# Patient Record
Sex: MALE | Race: WHITE | Employment: FULL TIME | URBAN - METROPOLITAN AREA
[De-identification: names, ages, dates, MRNs, and addresses within clinical notes are randomized per-mention and may not be internally consistent; named-entity substitution may affect disease eponyms.]

---

## 2019-09-08 ENCOUNTER — HOSPITAL ENCOUNTER (EMERGENCY)
Age: 24
Discharge: HOME | End: 2019-09-08

## 2019-09-08 VITALS
DIASTOLIC BLOOD PRESSURE: 80 MMHG | TEMPERATURE: 98.1 F | OXYGEN SATURATION: 99 % | SYSTOLIC BLOOD PRESSURE: 128 MMHG | HEART RATE: 89 BPM | HEIGHT: 72 IN | BODY MASS INDEX: 19.64 KG/M2 | WEIGHT: 145 LBS | RESPIRATION RATE: 16 BRPM

## 2019-09-08 DIAGNOSIS — R22.0 SWELLING OF LEFT SIDE OF FACE: Primary | ICD-10-CM

## 2019-09-08 DIAGNOSIS — K02.9 DENTAL CARIES: ICD-10-CM

## 2019-09-08 DIAGNOSIS — K05.10 GINGIVITIS: ICD-10-CM

## 2019-09-08 PROCEDURE — 99282 EMERGENCY DEPT VISIT SF MDM: CPT

## 2019-09-08 PROCEDURE — 6370000000 HC RX 637 (ALT 250 FOR IP): Performed by: PHYSICIAN ASSISTANT

## 2019-09-08 RX ORDER — PENICILLIN V POTASSIUM 250 MG/1
500 TABLET ORAL ONCE
Status: COMPLETED | OUTPATIENT
Start: 2019-09-08 | End: 2019-09-08

## 2019-09-08 RX ORDER — PENICILLIN V POTASSIUM 500 MG/1
500 TABLET ORAL 4 TIMES DAILY
Qty: 40 TABLET | Refills: 0 | Status: SHIPPED | OUTPATIENT
Start: 2019-09-08 | End: 2019-09-18

## 2019-09-08 RX ORDER — CHLORHEXIDINE GLUCONATE 0.12 MG/ML
15 RINSE ORAL 2 TIMES DAILY
Qty: 420 ML | Refills: 0 | Status: SHIPPED | OUTPATIENT
Start: 2019-09-08 | End: 2019-09-22

## 2019-09-08 RX ORDER — IBUPROFEN 800 MG/1
800 TABLET ORAL EVERY 8 HOURS PRN
Qty: 20 TABLET | Refills: 0 | Status: SHIPPED | OUTPATIENT
Start: 2019-09-08 | End: 2019-09-08 | Stop reason: SDUPTHER

## 2019-09-08 RX ORDER — IBUPROFEN 800 MG/1
800 TABLET ORAL EVERY 8 HOURS PRN
Qty: 20 TABLET | Refills: 0 | OUTPATIENT
Start: 2019-09-08 | End: 2022-02-23

## 2019-09-08 RX ADMIN — PENICILLIN V POTASIUM 500 MG: 250 TABLET ORAL at 12:33

## 2019-09-08 ASSESSMENT — ENCOUNTER SYMPTOMS
ABDOMINAL PAIN: 0
COUGH: 0
TROUBLE SWALLOWING: 0
NAUSEA: 0
SHORTNESS OF BREATH: 0
CONSTIPATION: 0
SINUS PRESSURE: 0
VOMITING: 0
DIARRHEA: 0
FACIAL SWELLING: 1
SORE THROAT: 0

## 2019-09-08 ASSESSMENT — PAIN DESCRIPTION - PAIN TYPE: TYPE: ACUTE PAIN

## 2019-09-08 ASSESSMENT — PAIN SCALES - GENERAL: PAINLEVEL_OUTOF10: 7

## 2019-09-08 ASSESSMENT — PAIN DESCRIPTION - LOCATION: LOCATION: TEETH

## 2019-09-08 NOTE — ED PROVIDER NOTES
shortness of breath. Cardiovascular: Negative for chest pain. Gastrointestinal: Negative for abdominal pain, constipation, diarrhea, nausea and vomiting. All other systems reviewed and are negative. Positives and Pertinent negatives as per HPI. Except as noted above in the ROS, all other systems were reviewed/completed and are negative. Physical Exam:  Physical Exam   Constitutional: He is oriented to person, place, and time. He appears well-developed and well-nourished. He is active and cooperative. Non-toxic appearance. HENT:   Head: Normocephalic. Right Ear: External ear normal.   Left Ear: External ear normal.   Nose: Nose normal.   Mouth/Throat: Uvula is midline and oropharynx is clear and moist. No oral lesions. No trismus in the jaw. Abnormal dentition. Dental caries present. No dental abscesses (no visable or palpable abscess) or uvula swelling. No oropharyngeal exudate, posterior oropharyngeal edema, posterior oropharyngeal erythema or tonsillar abscesses. Patient does have partial missing tooth #10 in #12. No uvula swelling, no trismus. Unable to palpate or visualize an abscess. Eyes: Conjunctivae are normal. Right eye exhibits no discharge. Left eye exhibits no discharge. Neck: Normal range of motion. Neck supple. Cardiovascular: Normal rate, regular rhythm and normal heart sounds. Exam reveals no gallop and no friction rub. No murmur heard. Pulmonary/Chest: Effort normal and breath sounds normal. No stridor. No respiratory distress. He has no wheezes. He has no rales. Musculoskeletal: Normal range of motion. Neurological: He is alert and oriented to person, place, and time. Skin: Skin is warm and dry. He is not diaphoretic. No pallor. Psychiatric: He has a normal mood and affect. His behavior is normal.   Nursing note and vitals reviewed.       MEDICAL DECISION MAKING    Vitals:    Vitals:    09/08/19 1155   BP: 128/80   Pulse: 89   Resp: 16   Temp: 98.1 °F (36.7 antibiotics for home. He is given a dental clinic list if he is unable to get into his dentist.  He is to return if he has been worsening of symptoms, development of fevers, difficulty swallowing. Patient is agreeable with this plan of care and otherwise well-appearing at time of discharge home. No signs of Eric's angina, trench mouth or other emergent dental etiology. The patient tolerated their visit well. I have evaluated the patient with physician available for consultation as needed. I have discussed the findings of today's workup with the patient and addressed the patient's questions and concerns. Important warning signs as well as new or worsening symptoms which wouldnecessitate immediate return to the ED were discussed. The plan is to discharge from the ED at this time, and the patient is in stable condition. The patient acknowledged understanding is agreeable with this plan. CLINICAL IMPRESSION:  1. Swelling of left side of face    2. Dental caries    3.  Gingivitis        DISPOSITION Decision To Discharge 09/08/2019 12:15:41 PM      PATIENT REFERRED TO:  see dental clinic list    Schedule an appointment as soon as possible for a visit       [facility] Emergency Department  [address] [place] [address]  [phone]  Go to   If symptoms worsen      DISCHARGE MEDICATIONS:  New Prescriptions    CHLORHEXIDINE (PERIDEX) 0.12 % SOLUTION    Take 15 mLs by mouth 2 times daily for 14 days    IBUPROFEN (ADVIL;MOTRIN) 800 MG TABLET    Take 1 tablet by mouth every 8 hours as needed for Pain    PENICILLIN V POTASSIUM (VEETID) 500 MG TABLET    Take 1 tablet by mouth 4 times daily for 10 days              (Please note the MDM and HPI sections of this note were completed with avoice recognition program.  Efforts were made to edit the dictations but occasionally words are mis-transcribed.)    Electronically signed, JAYLON Moran,            JAYLON Alvarado  09/08/19 2907

## 2019-11-06 ENCOUNTER — HOSPITAL ENCOUNTER (EMERGENCY)
Age: 24
Discharge: HOME | End: 2019-11-06

## 2019-11-06 VITALS
RESPIRATION RATE: 16 BRPM | OXYGEN SATURATION: 98 % | BODY MASS INDEX: 20.99 KG/M2 | WEIGHT: 155 LBS | DIASTOLIC BLOOD PRESSURE: 74 MMHG | HEIGHT: 72 IN | TEMPERATURE: 98.3 F | HEART RATE: 86 BPM | SYSTOLIC BLOOD PRESSURE: 129 MMHG

## 2019-11-06 DIAGNOSIS — J06.9 VIRAL URI WITH COUGH: Primary | ICD-10-CM

## 2019-11-06 DIAGNOSIS — H10.9 CONJUNCTIVITIS OF BOTH EYES, UNSPECIFIED CONJUNCTIVITIS TYPE: ICD-10-CM

## 2019-11-06 PROCEDURE — 99283 EMERGENCY DEPT VISIT LOW MDM: CPT

## 2019-11-06 RX ORDER — POLYMYXIN B SULFATE AND TRIMETHOPRIM 1; 10000 MG/ML; [USP'U]/ML
1 SOLUTION OPHTHALMIC EVERY 4 HOURS
Qty: 10 ML | Refills: 0 | Status: SHIPPED | OUTPATIENT
Start: 2019-11-06 | End: 2019-11-16

## 2019-11-06 RX ORDER — LORATADINE 10 MG/1
10 TABLET ORAL DAILY
Qty: 30 TABLET | Refills: 0 | Status: SHIPPED | OUTPATIENT
Start: 2019-11-06

## 2019-11-06 ASSESSMENT — ENCOUNTER SYMPTOMS
EYE REDNESS: 1
EYE ITCHING: 1
PHOTOPHOBIA: 0
SHORTNESS OF BREATH: 0
SORE THROAT: 1
VOMITING: 0
EYE PAIN: 0
RHINORRHEA: 1
NAUSEA: 0
COUGH: 1
DIARRHEA: 0
WHEEZING: 0
TROUBLE SWALLOWING: 0
ABDOMINAL PAIN: 0
EYE DISCHARGE: 1
VOICE CHANGE: 0

## 2021-07-22 ENCOUNTER — HOSPITAL ENCOUNTER (EMERGENCY)
Age: 26
Discharge: HOME | End: 2021-07-22
Attending: EMERGENCY MEDICINE
Payer: COMMERCIAL

## 2021-07-22 VITALS
SYSTOLIC BLOOD PRESSURE: 143 MMHG | DIASTOLIC BLOOD PRESSURE: 89 MMHG | OXYGEN SATURATION: 99 % | RESPIRATION RATE: 18 BRPM | HEART RATE: 61 BPM | TEMPERATURE: 97.8 F

## 2021-07-22 DIAGNOSIS — S61.012A LACERATION OF LEFT THUMB, FOREIGN BODY PRESENCE UNSPECIFIED, NAIL DAMAGE STATUS UNSPECIFIED, INITIAL ENCOUNTER: Primary | ICD-10-CM

## 2021-07-22 PROCEDURE — 90471 IMMUNIZATION ADMIN: CPT | Performed by: STUDENT IN AN ORGANIZED HEALTH CARE EDUCATION/TRAINING PROGRAM

## 2021-07-22 PROCEDURE — 99283 EMERGENCY DEPT VISIT LOW MDM: CPT

## 2021-07-22 PROCEDURE — 2500000003 HC RX 250 WO HCPCS: Performed by: STUDENT IN AN ORGANIZED HEALTH CARE EDUCATION/TRAINING PROGRAM

## 2021-07-22 PROCEDURE — 12001 RPR S/N/AX/GEN/TRNK 2.5CM/<: CPT

## 2021-07-22 PROCEDURE — 90715 TDAP VACCINE 7 YRS/> IM: CPT | Performed by: STUDENT IN AN ORGANIZED HEALTH CARE EDUCATION/TRAINING PROGRAM

## 2021-07-22 PROCEDURE — 6360000002 HC RX W HCPCS: Performed by: STUDENT IN AN ORGANIZED HEALTH CARE EDUCATION/TRAINING PROGRAM

## 2021-07-22 RX ORDER — BUPIVACAINE HYDROCHLORIDE 2.5 MG/ML
30 INJECTION, SOLUTION EPIDURAL; INFILTRATION; INTRACAUDAL ONCE
Status: COMPLETED | OUTPATIENT
Start: 2021-07-22 | End: 2021-07-22

## 2021-07-22 RX ADMIN — TETANUS TOXOID, REDUCED DIPHTHERIA TOXOID AND ACELLULAR PERTUSSIS VACCINE, ADSORBED 0.5 ML: 5; 2.5; 8; 8; 2.5 SUSPENSION INTRAMUSCULAR at 15:15

## 2021-07-22 RX ADMIN — BUPIVACAINE HYDROCHLORIDE 75 MG: 2.5 INJECTION, SOLUTION EPIDURAL; INFILTRATION; INTRACAUDAL; PERINEURAL at 15:22

## 2021-07-22 ASSESSMENT — PAIN SCALES - GENERAL: PAINLEVEL_OUTOF10: 0

## 2021-07-22 ASSESSMENT — ENCOUNTER SYMPTOMS
NAUSEA: 0
VOMITING: 0
SHORTNESS OF BREATH: 0
SORE THROAT: 0
DIARRHEA: 0
COUGH: 0

## 2021-07-22 NOTE — ED PROVIDER NOTES
Date of evaluation: 7/22/2021    Chief Complaint   Laceration (cut his left thumb with a knife while cutting singh)      Nursing Notes, Past Medical Hx, Past Surgical Hx, Social Hx, Allergies, and Family Hx were reviewed. History of Present Illness     Yovani Pappas is a 22 y.o. male who presents to the ED after he was cutting singh and used a knife to cut towards him and he lacerated his left thumb. This occurred around a half hour ago and it has not stopped bleeding, which is why he came to the ED. He tried to stop it by wrapping it up, but it continues to bleed on the radial side of his left thumb. He also reports some numbness and tingling. He describes the pain as a sharp pain that is localized to his thumb. He states the pain is an 8 out of 10. Review of Systems     Review of Systems   Constitutional: Negative for fatigue and fever. HENT: Negative for congestion and sore throat. Respiratory: Negative for cough and shortness of breath. Cardiovascular: Negative for chest pain and palpitations. Gastrointestinal: Negative for diarrhea, nausea and vomiting. Genitourinary: Negative for difficulty urinating. Musculoskeletal: Negative for arthralgias and myalgias. Skin: Positive for wound. Negative for rash. Neurological: Positive for numbness. Negative for dizziness and headaches. Hematological: Does not bruise/bleed easily. Psychiatric/Behavioral: Negative for confusion and hallucinations. All other systems reviewed and are negative. Past Medical, Surgical, Family, and Social History         Diagnosis Date    Kidney stone          Procedure Laterality Date    CYSTOSCOPY  4/29/13    with right retrograde and right stent placement    CYSTOSCOPY  5/10/2013    Removal of R Jtube and R ureteroscopy     His family history is not on file. He reports that he has been smoking. He has a 1.00 pack-year smoking history.  He has never used smokeless tobacco. He reports current alcohol use. He reports current drug use. Drug: Marijuana. Medications     Previous Medications    IBUPROFEN (ADVIL;MOTRIN) 800 MG TABLET    Take 1 tablet by mouth every 8 hours as needed for Pain    LORATADINE (CLARITIN) 10 MG TABLET    Take 1 tablet by mouth daily       Allergies     He has No Known Allergies. Physical Exam     INITIAL VITALS: BP (!) 143/89   Pulse 61   Temp 97.8 °F (36.6 °C) (Oral)   Resp 18   SpO2 99%        Physical Exam  Constitutional:       Appearance: Normal appearance. HENT:      Head: Normocephalic and atraumatic. Nose: Nose normal. No congestion or rhinorrhea. Mouth/Throat:      Mouth: Mucous membranes are moist.      Pharynx: Oropharynx is clear. Cardiovascular:      Rate and Rhythm: Normal rate and regular rhythm. Pulses: Normal pulses. Heart sounds: Normal heart sounds. Pulmonary:      Effort: Pulmonary effort is normal.      Breath sounds: Normal breath sounds. Abdominal:      General: Abdomen is flat. Bowel sounds are normal.      Palpations: Abdomen is soft. Musculoskeletal:         General: Normal range of motion. Skin:     General: Skin is warm and dry. Findings: Lesion present. Comments: Laceration present on radial side of left thumb    Neurological:      General: No focal deficit present. Mental Status: He is alert and oriented to person, place, and time. Psychiatric:         Mood and Affect: Mood normal.         Behavior: Behavior normal.       Extremities: sensation intact B/L in UE and LE       Diagnostic Results         RADIOLOGY:  No orders to display          LABS:   Labs Reviewed - No data to display    RECENT VITALS:  BP: (!) 143/89, Temp: 97.8 °F (36.6 °C), Pulse: 61, Resp: 18     Procedures     Per attending Physician, Dr. Luther Mccord note.      ED Course     The patient was given the following medications:  Orders Placed This Encounter   Medications    Tetanus-Diphth-Acell Pertussis (BOOSTRIX) injection 0.5 mL    bupivacaine (PF) (MARCAINE) 0.25 % injection 75 mg            CONSULTS:  None    MEDICAL DECISION MAKING     Allison Mullins is a 22 y.o. male who presents to the ED after a laceration on the radial side of his left thumb. The patient's wound was debrided and irrigated to prevent infection. Digital block with bupivacaine applied. Patient was given 4 sutures of his left thumb to close up wound with sterile precautions. He was given a Tetanus injection. Due to the nature of his wound, it was decided it was classified as a \"dirty wound,\" so his tetanus will last 5 years. Patient was given information for wound care and instructed to use hydrogen peroxide on wound. He was additionally instructed to Follow-up with PCP or ED for removal of his sutures in around 8 days. This patient was also evaluated by the attending physician. All care plans were discussed and agreed upon. Clinical Impression     1. Laceration of left thumb, foreign body presence unspecified, nail damage status unspecified, initial encounter        Disposition/Plan     PATIENT REFERRED TO:  No follow-up provider specified.     DISCHARGE MEDICATIONS:  New Prescriptions    No medications on file       Amada Frazier07-A 1498, DO  Resident  07/22/21 2341

## 2021-07-22 NOTE — ED PROVIDER NOTES
ED Attending Attestation Note     Date of evaluation: 7/22/2021    This patient was seen by the resident. I have seen and examined the patient, agree with the workup, evaluation, management and diagnosis. The care plan has been discussed. My assessment reveals here for a left thumb laceration. He was placing some singh and excellently cut himself with a singh knife. He denies numbness tingling decreased function of his thumb. On exam 2.5 cm laceration that starts on the palmar aspect and travels on the ulnar part of the thumb to the PIP joint. Does not appear to violate the joint. Lac Repair    Date/Time: 7/22/2021 3:53 PM  Performed by: Nish Marquez MD  Authorized by: Nish Marquez MD     Consent:     Consent obtained:  Verbal    Consent given by:  Patient    Risks discussed:  Infection, pain, retained foreign body and poor cosmetic result    Alternatives discussed:  No treatment  Anesthesia (see MAR for exact dosages):      Anesthesia method:  Nerve block    Block anesthetic:  Bupivacaine 0.25% w/o epi    Block injection procedure:  Anatomic landmarks identified and negative aspiration for blood    Block outcome:  Anesthesia achieved  Laceration details:     Location:  Finger    Finger location:  L thumb    Length (cm):  2.5    Depth (mm):  5  Repair type:     Repair type:  Simple  Pre-procedure details:     Preparation:  Patient was prepped and draped in usual sterile fashion  Exploration:     Hemostasis achieved with:  Direct pressure    Wound extent: areolar tissue violated      Wound extent: no muscle damage noted, no nerve damage noted and no tendon damage noted      Contaminated: no    Treatment:     Area cleansed with:  Hibiclens    Amount of cleaning:  Standard    Irrigation solution:  Sterile saline    Irrigation method:  Syringe    Visualized foreign bodies/material removed: no    Skin repair:     Repair method:  Sutures    Suture size:  5-0    Suture material:  Nylon Number of sutures:  4  Approximation:     Approximation:  Close  Post-procedure details:     Dressing:  Antibiotic ointment and non-adherent dressing    Patient tolerance of procedure:   Tolerated well, no immediate complications         Mandeep Gilman MD  07/22/21 1457

## 2021-07-22 NOTE — DISCHARGE INSTR - COC
Continuity of Care Form    Patient Name: Zena Kemp   :  1995  MRN:  [id number]    Admit date:  2021  Discharge date:  ***    Code Status Order: Prior   Advance Directives:     Admitting Physician:  No admitting provider for patient encounter. PCP: Kayla Langston MD    Discharging Nurse: [facility] Unit/Room#: B18/B18-18  Discharging Unit Phone Number: ***    Emergency Contact:   Extended Emergency Contact Information  Primary Emergency Contact: Anastasia Jackson  Address: [address] [place] [address], [address] [place] [address] Phone: [phone]  Mobile Phone: [phone]  Relation: Parent    Past Surgical History:  Past Surgical History:   Procedure Laterality Date    CYSTOSCOPY  13    with right retrograde and right stent placement    CYSTOSCOPY  5/10/2013    Removal of R Jtube and R ureteroscopy       Immunization History:   Immunization History   Administered Date(s) Administered    Tdap (Boostrix, Adacel) 2021       Active Problems: There is no problem list on file for this patient.       Isolation/Infection:   Isolation          No Isolation        Patient Infection Status     None to display          Nurse Assessment:  Last Vital Signs: BP (!) 143/89   Pulse 61   Temp 97.8 °F (36.6 °C) (Oral)   Resp 18   SpO2 99%     Last documented pain score (0-10 scale): Pain Level: 0  Last Weight:   Wt Readings from Last 1 Encounters:   19 155 lb (70.3 kg)     Mental Status:  {IP PT MENTAL STATUS:10340}    IV Access:  { MAYRA IV ACCESS:757142605}    Nursing Mobility/ADLs:  Walking   {CHP DME FKDB:561854792}  Transfer  {CHP DME QYQQ:639073993}  Bathing  {CHP DME LVPC:679018244}  Dressing  {CHP DME OZTK:842726106}  Toileting  {CHP DME DYQV:439668438}  Feeding  {CHP DME UIIU:045684723}  Med Admin  {CHP DME VQLF:894904284}  Med Delivery   { MAYRA MED Delivery:654223134}    Wound Care Documentation and Therapy:        Elimination:  Continence: · Bowel: {YES / JE:87400}  · Bladder: {YES / BJ:66521}  Urinary Catheter: {Urinary Catheter:263211867}   Colostomy/Ileostomy/Ileal Conduit: {YES / GA:66554}       Date of Last BM: ***  No intake or output data in the 24 hours ending 21 1558  No intake/output data recorded.     Safety Concerns:     [address] [facility] Safety Concerns:618349856}    Impairments/Disabilities:      [address] [facility] Impairments/Disabilities:862637055}    Nutrition Therapy:  Current Nutrition Therapy:   [address] [facility] Diet List:798475185}    Routes of Feeding: {CHP DME Other Feedings:979115463}  Liquids: {Slp liquid thickness:56788}  Daily Fluid Restriction: {CHP DME Yes amt example:002073142}  Last Modified Barium Swallow with Video (Video Swallowing Test): {Done Not Done TPXZ:605268976}    Treatments at the Time of Hospital Discharge:   Respiratory Treatments: ***  Oxygen Therapy:  {Therapy; copd oxygen:09289}  Ventilator:    {[facility] Vent HTKO:583768570}    Rehab Therapies: {THERAPEUTIC INTERVENTION:7522638063}  Weight Bearing Status/Restrictions: [address] [place] [address] Weight Bearin}  Other Medical Equipment (for information only, NOT a DME order):  {EQUIPMENT:929097206}  Other Treatments: ***    Patient's personal belongings (please select all that are sent with patient):  {P DME Belongings:864031427}    RN SIGNATURE:  {Esignature:316842508}    CASE MANAGEMENT/SOCIAL WORK SECTION    Inpatient Status Date: ***    Readmission Risk Assessment Score:  Readmission Risk              Risk of Unplanned Readmission:  0           Discharging to Facility/ Agency   · Name:   · Address:  · Phone:  · Fax:    Dialysis Facility (if applicable)   · Name:  · Address:  · Dialysis Schedule:  · Phone:  · Fax:    / signature: {Esignature:636848604}    PHYSICIAN SECTION    Prognosis: {Prognosis:1970411901}    Condition at Discharge: [address] Patient Condition:868167200}    Rehab Potential (if transferring to Rehab): {Prognosis:5721485155}    Recommended Labs or Other Treatments After Discharge: ***    Physician Certification: I certify the above information and transfer of [place] [facility]  is necessary for the continuing treatment of the diagnosis listed and that he requires {Admit to Appropriate Level of Care:10032} for {GREATER/LESS:772532504} 30 days.      Update Admission H&P: {CHP DME Changes in YLJRP:565689795}    PHYSICIAN SIGNATURE:  {Esignature:871336902}

## 2022-02-23 ENCOUNTER — HOSPITAL ENCOUNTER (EMERGENCY)
Age: 27
Discharge: HOME | End: 2022-02-23
Payer: COMMERCIAL

## 2022-02-23 VITALS
TEMPERATURE: 98.4 F | BODY MASS INDEX: 21.67 KG/M2 | HEART RATE: 92 BPM | RESPIRATION RATE: 20 BRPM | WEIGHT: 160 LBS | SYSTOLIC BLOOD PRESSURE: 142 MMHG | DIASTOLIC BLOOD PRESSURE: 94 MMHG | OXYGEN SATURATION: 100 % | HEIGHT: 72 IN

## 2022-02-23 DIAGNOSIS — S16.1XXA CERVICAL STRAIN, ACUTE, INITIAL ENCOUNTER: Primary | ICD-10-CM

## 2022-02-23 PROCEDURE — 6370000000 HC RX 637 (ALT 250 FOR IP): Performed by: PHYSICIAN ASSISTANT

## 2022-02-23 PROCEDURE — 99284 EMERGENCY DEPT VISIT MOD MDM: CPT

## 2022-02-23 RX ORDER — HYDROCODONE BITARTRATE AND ACETAMINOPHEN 5; 325 MG/1; MG/1
1 TABLET ORAL ONCE
Status: COMPLETED | OUTPATIENT
Start: 2022-02-23 | End: 2022-02-23

## 2022-02-23 RX ORDER — CYCLOBENZAPRINE HCL 10 MG
10 TABLET ORAL ONCE
Status: COMPLETED | OUTPATIENT
Start: 2022-02-23 | End: 2022-02-23

## 2022-02-23 RX ORDER — NAPROXEN 500 MG/1
500 TABLET ORAL 2 TIMES DAILY WITH MEALS
Qty: 30 TABLET | Refills: 0 | Status: SHIPPED | OUTPATIENT
Start: 2022-02-23

## 2022-02-23 RX ORDER — LIDOCAINE 4 G/G
1 PATCH TOPICAL ONCE
Status: DISCONTINUED | OUTPATIENT
Start: 2022-02-23 | End: 2022-02-23 | Stop reason: HOSPADM

## 2022-02-23 RX ORDER — NAPROXEN 250 MG/1
500 TABLET ORAL ONCE
Status: COMPLETED | OUTPATIENT
Start: 2022-02-23 | End: 2022-02-23

## 2022-02-23 RX ORDER — LIDOCAINE 50 MG/G
1 PATCH TOPICAL DAILY
Qty: 30 PATCH | Refills: 0 | Status: SHIPPED | OUTPATIENT
Start: 2022-02-23

## 2022-02-23 RX ORDER — CYCLOBENZAPRINE HCL 10 MG
10 TABLET ORAL 3 TIMES DAILY PRN
Qty: 20 TABLET | Refills: 0 | Status: SHIPPED | OUTPATIENT
Start: 2022-02-23 | End: 2022-03-02

## 2022-02-23 RX ADMIN — NAPROXEN 500 MG: 250 TABLET ORAL at 15:54

## 2022-02-23 RX ADMIN — HYDROCODONE BITARTRATE AND ACETAMINOPHEN 1 TABLET: 5; 325 TABLET ORAL at 15:54

## 2022-02-23 RX ADMIN — CYCLOBENZAPRINE 10 MG: 10 TABLET, FILM COATED ORAL at 15:54

## 2022-02-23 ASSESSMENT — ENCOUNTER SYMPTOMS
DIARRHEA: 0
COLOR CHANGE: 0
PHOTOPHOBIA: 0
ABDOMINAL PAIN: 0
RESPIRATORY NEGATIVE: 1
CHEST TIGHTNESS: 0
BACK PAIN: 0
COUGH: 0
VOMITING: 0
SHORTNESS OF BREATH: 0
CONSTIPATION: 0
NAUSEA: 0

## 2022-02-23 ASSESSMENT — PAIN SCALES - GENERAL: PAINLEVEL_OUTOF10: 10

## 2022-02-23 NOTE — ED PROVIDER NOTES
[facility]        Pt Name: Broderick Snider  MRN: [id number]  [place] 1995  Date of evaluation: 2/23/2022  Provider: JAYLON Brown  PCP: Jorge Griffin MD  Note Started: 3:51 PM EST       MAXIMO. I have evaluated this patient. My supervising physician was available for consultation. CHIEF COMPLAINT       Chief Complaint   Patient presents with    Neck Pain     Came in for c/o right sided neck pain. Pain started 12am today. HISTORY OF PRESENT ILLNESS   (Location, Timing/Onset, Context/Setting, Quality, Duration, Modifying Factors, Severity, Associated Signs and Symptoms)  Note limiting factors. Chief Complaint: Neck pain    Broderick Snider is a 32 y.o. male with no significant past medical history who presents to the ED with complaint of right-sided neck pain. Patient states yesterday he woke up and states he stretched. States he felt a pull in his right-sided neck. Patient states he thought he just pulled something in his neck. States his father gave him some ibuprofen and he states pain did get better and he was able to get to sleep. States he woke up today and states he went to reach for something and felt another pull in his right-sided neck and said ever since then he has had pain that he rates as a aching/throbbing rated 9/10. States pain is any worse with palpation and movement to the right-sided neck. He denies any anterior neck pain. Denies headache, lightheadedness/dizziness, syncope, near syncope, visual changes, speech disturbances or numbness/tingling. He denies any radiation to his arms bilaterally. He denies any weakness of the arms or legs. Denies any decreased range of motion or strength. Denies chest pain, shortness of breath, You pain, nausea/vomiting, urinary symptoms or changes in bowel movements. Denies fever chills. Denies any rashes or lesions.   Denies any sore throat, dysphagia, drooling, trismus, stridor or respiratory distress. Denies taking any over-the-counter medication today for symptom control. Nursing Notes were all reviewed and agreed with or any disagreements were addressed in the HPI. REVIEW OF SYSTEMS    (2-9 systems for level 4, 10 or more for level 5)     Review of Systems   Constitutional: Negative for activity change, appetite change, chills, diaphoresis, fatigue and fever. Eyes: Negative for photophobia and visual disturbance. Respiratory: Negative. Negative for cough, chest tightness and shortness of breath. Cardiovascular: Negative. Negative for chest pain, palpitations and leg swelling. Gastrointestinal: Negative for abdominal pain, constipation, diarrhea, nausea and vomiting. Genitourinary: Negative for decreased urine volume, difficulty urinating, dysuria, flank pain, frequency, hematuria and urgency. Musculoskeletal: Positive for arthralgias, myalgias, neck pain and neck stiffness. Negative for back pain, gait problem and joint swelling. Skin: Negative for color change, pallor, rash and wound. Neurological: Negative for dizziness, tremors, seizures, syncope, facial asymmetry, speech difficulty, weakness, light-headedness, numbness and headaches. Positives and Pertinent negatives as per HPI. Except as noted above in the ROS, all other systems were reviewed and negative. PAST MEDICAL HISTORY     Past Medical History:   Diagnosis Date    Kidney stone          SURGICAL HISTORY     Past Surgical History:   Procedure Laterality Date    CYSTOSCOPY  4/29/13    with right retrograde and right stent placement    CYSTOSCOPY  5/10/2013    Removal of R Jtube and R ureteroscopy         CURRENTMEDICATIONS       Previous Medications    LORATADINE (CLARITIN) 10 MG TABLET    Take 1 tablet by mouth daily         ALLERGIES     Patient has no known allergies. FAMILYHISTORY     History reviewed. No pertinent family history. SOCIAL HISTORY       Social History     Tobacco Use    Smoking status: Current Every Day Smoker     Packs/day: 1.00     Years: 1.00     Pack years: 1.00    Smokeless tobacco: Never Used   Substance Use Topics    Alcohol use: Yes     Comment: occ    Drug use: Yes     Types: Marijuana (Weed)     Comment: occ       SCREENINGS    Dexter Coma Scale  Eye Opening: Spontaneous  Best Verbal Response: Oriented  Best Motor Response: Obeys commands  Dexter Coma Scale Score: 15        PHYSICAL EXAM    (up to 7 for level 4, 8 or more for level 5)     ED Triage Vitals [02/23/22 1528]   BP Temp Temp Source Pulse Resp SpO2 Height Weight   (!) 131/102 98.4 °F (36.9 °C) Oral 92 20 100 % 6' (1.829 m) 160 lb (72.6 kg)       Physical Exam  Constitutional:       General: He is not in acute distress. Appearance: Normal appearance. He is well-developed. He is not ill-appearing, toxic-appearing or diaphoretic. HENT:      Head: Normocephalic and atraumatic. Right Ear: External ear normal.      Left Ear: External ear normal.      Mouth/Throat:      Mouth: Mucous membranes are moist.      Pharynx: No oropharyngeal exudate or posterior oropharyngeal erythema. Eyes:      General:         Right eye: No discharge. Left eye: No discharge. Extraocular Movements: Extraocular movements intact. Conjunctiva/sclera: Conjunctivae normal.      Pupils: Pupils are equal, round, and reactive to light. Neck:      Vascular: No carotid bruit. Cardiovascular:      Rate and Rhythm: Normal rate and regular rhythm. Pulses: Normal pulses. Heart sounds: Normal heart sounds. No murmur heard. No friction rub. No gallop. Pulmonary:      Effort: Pulmonary effort is normal. No respiratory distress. Breath sounds: Normal breath sounds. No stridor. No wheezing, rhonchi or rales. Chest:      Chest wall: No tenderness. Abdominal:      General: Abdomen is flat. There is no distension.       Palpations: Abdomen is soft. There is no mass. Tenderness: There is no abdominal tenderness. There is no right CVA tenderness, left CVA tenderness, guarding or rebound. Hernia: No hernia is present. Musculoskeletal:         General: Normal range of motion. Cervical back: Normal range of motion and neck supple. Tenderness present. No rigidity. Comments: Patient has tender palpation over the right cervical paraspinal musculature and lateral right-sided neck. There is no tenderness to palpation over the midline cervical, thoracic or lumbar spine. There is no anterior neck tenderness. No abnormal carotid pulses or bruits. Patient has full range of motion strength of the neck with pain with lateral flexion and rotation to the right. Pain also present with flexion extension of the neck. There is no rashes or lesions noted. No skin changes. Full range of motion strength of bilateral shoulders, elbows and wrist.  Full range of motion strength in the distal median, ulnar and radial nerve distribution bilaterally. Radial pulse and capillary refill brisk bilaterally. Sensation intact to the right ulnar aspects distal fingertips bilaterally. Lymphadenopathy:      Cervical: No cervical adenopathy. Skin:     General: Skin is warm and dry. Coloration: Skin is not pale. Findings: No erythema or rash. Neurological:      General: No focal deficit present. Mental Status: He is alert and oriented to person, place, and time. GCS: GCS eye subscore is 4. GCS verbal subscore is 5. GCS motor subscore is 6. Cranial Nerves: Cranial nerves are intact. No cranial nerve deficit, dysarthria or facial asymmetry. Sensory: Sensation is intact. No sensory deficit. Motor: Motor function is intact. No weakness. Coordination: Coordination is intact. Gait: Gait is intact.  Gait normal.   Psychiatric:         Behavior: Behavior normal.         DIAGNOSTIC RESULTS   LABS:    Labs Reviewed - No data to display    When ordered only abnormal lab results are displayed. All other labs were within normal range or not returned as of this dictation. EKG: When ordered, EKG's are interpreted by the Emergency Department Physician in the absence of a cardiologist.  Please see their note for interpretation of EKG. RADIOLOGY:   Non-plain film images such as CT, Ultrasound and MRI are read by the radiologist. Plain radiographic images are visualized and preliminarily interpreted by the ED Provider with the below findings:        Interpretation per the Radiologist below, if available at the time of this note:    No orders to display     No results found. PROCEDURES   Unless otherwise noted below, none     Procedures    CRITICAL CARE TIME       CONSULTS:  None      EMERGENCY DEPARTMENT COURSE and DIFFERENTIAL DIAGNOSIS/MDM:   Vitals:    Vitals:    02/23/22 1528 02/23/22 1531   BP: (!) 131/102 (!) 142/94   Pulse: 92    Resp: 20    Temp: 98.4 °F (36.9 °C)    TempSrc: Oral    SpO2: 100%    Weight: 160 lb (72.6 kg)    Height: 6' (1.829 m)        Patient was given the following medications:  Medications   HYDROcodone-acetaminophen (NORCO) 5-325 MG per tablet 1 tablet (has no administration in time range)   cyclobenzaprine (FLEXERIL) tablet 10 mg (has no administration in time range)   naproxen (NAPROSYN) tablet 500 mg (has no administration in time range)   lidocaine 4 % external patch 1 patch (has no administration in time range)           Patient is a 55-year-old male who presents to the ED with complaint of neck pain. Patient complaining of neck pain and upon examination has since palpation over the cervical paraspinal musculature. Concern for cervical strain at this time. He has no midline cervical spine tenderness and has had no direct injury. Do not believe x-ray or CT imaging indicated this time. Do not believe emergent MRI indicated. He is likely some from cervical strain.   Will give dose of Norco, Flexeril, Naprosyn and lidocaine patch here in the emergency department. Will discharge home with Flexeril, Naprosyn and lidocaine patches. Patient instructed on ice and then heating pad tomorrow. Instructed on range of motion activity at home. Follow-up with PCP. Return the ED for new or worsening symptoms. Low suspicion for acute fracture, dislocation, dissection, AAA, cauda equina, pleural abscess, spinal stenosis, epidural hematoma, cord compression, intrathoracic abnormality, intracranial abnormality or other emergent condition at this time. FINAL IMPRESSION      1. Cervical strain, acute, initial encounter          DISPOSITION/PLAN   DISPOSITION Discharge - Pending Orders Complete 02/23/2022 03:45:16 PM      PATIENT REFERRED TO:  Asher Angelucci, MD [address]  [address] [place] [address]. [facility]  [phone]    Schedule an appointment as soon as possible for a visit   For a Re-check in  5-7    days. [facility] Emergency Department  [address] [facility]  [phone]  Go to   As needed, If symptoms worsen      DISCHARGE MEDICATIONS:  New Prescriptions    CYCLOBENZAPRINE (FLEXERIL) 10 MG TABLET    Take 1 tablet by mouth 3 times daily as needed for Muscle spasms    LIDOCAINE (LIDODERM) 5 %    Place 1 patch onto the skin daily 12 hours on, 12 hours off.     NAPROXEN (NAPROSYN) 500 MG TABLET    Take 1 tablet by mouth 2 times daily (with meals)       DISCONTINUED MEDICATIONS:  Discontinued Medications    IBUPROFEN (ADVIL;MOTRIN) 800 MG TABLET    Take 1 tablet by mouth every 8 hours as needed for Pain              (Please note that portions of this note were completed with a voice recognition program.  Efforts were made to edit the dictations but occasionally words are mis-transcribed.)    JAYLON London (electronically signed)          JAYLON Salamanca  02/23/22 3201